# Patient Record
Sex: MALE | Race: WHITE | NOT HISPANIC OR LATINO | Employment: PART TIME | URBAN - METROPOLITAN AREA
[De-identification: names, ages, dates, MRNs, and addresses within clinical notes are randomized per-mention and may not be internally consistent; named-entity substitution may affect disease eponyms.]

---

## 2024-07-15 ENCOUNTER — OFFICE VISIT (OUTPATIENT)
Dept: PRIMARY CARE | Facility: CLINIC | Age: 24
End: 2024-07-15
Payer: COMMERCIAL

## 2024-07-15 ENCOUNTER — TELEPHONE (OUTPATIENT)
Dept: PRIMARY CARE | Facility: CLINIC | Age: 24
End: 2024-07-15

## 2024-07-15 VITALS
HEIGHT: 73 IN | BODY MASS INDEX: 17.1 KG/M2 | DIASTOLIC BLOOD PRESSURE: 70 MMHG | HEART RATE: 61 BPM | SYSTOLIC BLOOD PRESSURE: 120 MMHG | OXYGEN SATURATION: 98 % | WEIGHT: 129 LBS

## 2024-07-15 DIAGNOSIS — R51.9 ACUTE NONINTRACTABLE HEADACHE, UNSPECIFIED HEADACHE TYPE: Primary | ICD-10-CM

## 2024-07-15 PROCEDURE — 1036F TOBACCO NON-USER: CPT | Performed by: FAMILY MEDICINE

## 2024-07-15 PROCEDURE — 99213 OFFICE O/P EST LOW 20 MIN: CPT | Performed by: FAMILY MEDICINE

## 2024-07-15 RX ORDER — HYDROXYZINE HYDROCHLORIDE 25 MG/1
1 TABLET, FILM COATED ORAL 2 TIMES DAILY PRN
COMMUNITY
Start: 2024-05-24

## 2024-07-15 RX ORDER — ESCITALOPRAM OXALATE 10 MG/1
10 TABLET ORAL
COMMUNITY

## 2024-07-15 ASSESSMENT — PATIENT HEALTH QUESTIONNAIRE - PHQ9
SUM OF ALL RESPONSES TO PHQ9 QUESTIONS 1 AND 2: 0
1. LITTLE INTEREST OR PLEASURE IN DOING THINGS: NOT AT ALL
2. FEELING DOWN, DEPRESSED OR HOPELESS: NOT AT ALL

## 2024-07-15 ASSESSMENT — PAIN SCALES - GENERAL: PAINLEVEL: 0-NO PAIN

## 2024-07-15 NOTE — PROGRESS NOTES
"Subjective   Patient ID: Jasvir Galan is a 24 y.o. male who presents for Headache (X 4 days-  intermittently.  He says it has been hard to concentrate, some slight light sensitive.  No nausea or vomiting.  Felt some lightheadedness.   No sinus congestion or post nasal drainage).    HPI here with a 4-day history of headache.  He is mildly sensitive to light when the headache hurts.  Pain is migrating in location.  He did have some dizziness with 1 episode but it has resolved.  Patient currently has no headache at this time.  There is a strong family history of migraine in his family.  Patient stated today that he did get relief by seeking a dark room and using ibuprofen.    Review of Systems  Constitutional: Patient is negative for fever, fatigue, weight change.  HEENT: Patient is positive for pain and pressure above and below the eyes.  He is negative for change in vision, hearing, swallow.  Cardio: Patient is negative for chest pain, lower extremity edema.  Pulmonary: Patient is negative for cough, shortness of breath.  Objective   /70 (BP Location: Left arm, Patient Position: Sitting, BP Cuff Size: Adult)   Pulse 61   Ht 1.854 m (6' 1\")   Wt 58.5 kg (129 lb)   SpO2 98%   BMI 17.02 kg/m²     Physical Exam  General: Awake and alert no apparent distress.  HEENT: No noticeable discomfort to challenge.  TMs with good color and light reflex.  No cervical lymphadenopathy.  No pain to palpation over the maxillary or frontal sinuses.  Cardio: Heart S1-S2 no murmur rub or gallop.  Pulmonary: Lungs clear to auscultation bilaterally.  Assessment/Plan   Problem List Items Addressed This Visit    None  Visit Diagnoses         Codes    Acute nonintractable headache, unspecified headache type    -  Primary   currently resolving.  Etiology is unclear.  This may be sinus pain and pressure or it may be new onset migraine.  Patient to use ibuprofen as needed.  He was advised to try using nasal steroid spray.  If he " sees no relief he is to follow-up here and at that time may consider referral to headache specialist R51.9

## 2024-10-10 ENCOUNTER — TELEPHONE (OUTPATIENT)
Dept: PRIMARY CARE | Facility: CLINIC | Age: 24
End: 2024-10-10
Payer: COMMERCIAL

## 2024-10-30 ENCOUNTER — APPOINTMENT (OUTPATIENT)
Dept: PRIMARY CARE | Facility: CLINIC | Age: 24
End: 2024-10-30
Payer: COMMERCIAL

## 2024-10-30 VITALS
HEART RATE: 67 BPM | OXYGEN SATURATION: 97 % | DIASTOLIC BLOOD PRESSURE: 64 MMHG | BODY MASS INDEX: 17.89 KG/M2 | TEMPERATURE: 97.9 F | SYSTOLIC BLOOD PRESSURE: 106 MMHG | HEIGHT: 73 IN | WEIGHT: 135 LBS

## 2024-10-30 DIAGNOSIS — Z23 ENCOUNTER FOR IMMUNIZATION: ICD-10-CM

## 2024-10-30 DIAGNOSIS — Z00.00 WELL ADULT EXAM: ICD-10-CM

## 2024-10-30 DIAGNOSIS — I83.812 VARICOSE VEINS OF LEFT LOWER EXTREMITY WITH PAIN: ICD-10-CM

## 2024-10-30 DIAGNOSIS — F32.A DEPRESSION, UNSPECIFIED DEPRESSION TYPE: ICD-10-CM

## 2024-10-30 DIAGNOSIS — J45.21 MILD INTERMITTENT ASTHMA WITH EXACERBATION (HHS-HCC): Primary | ICD-10-CM

## 2024-10-30 PROCEDURE — 90656 IIV3 VACC NO PRSV 0.5 ML IM: CPT | Performed by: FAMILY MEDICINE

## 2024-10-30 PROCEDURE — 1036F TOBACCO NON-USER: CPT | Performed by: FAMILY MEDICINE

## 2024-10-30 PROCEDURE — 90471 IMMUNIZATION ADMIN: CPT | Performed by: FAMILY MEDICINE

## 2024-10-30 PROCEDURE — 99395 PREV VISIT EST AGE 18-39: CPT | Performed by: FAMILY MEDICINE

## 2024-10-30 PROCEDURE — 3008F BODY MASS INDEX DOCD: CPT | Performed by: FAMILY MEDICINE

## 2024-10-30 ASSESSMENT — ENCOUNTER SYMPTOMS
DEPRESSION: 0
OCCASIONAL FEELINGS OF UNSTEADINESS: 0
LOSS OF SENSATION IN FEET: 0

## 2024-10-30 ASSESSMENT — PATIENT HEALTH QUESTIONNAIRE - PHQ9
1. LITTLE INTEREST OR PLEASURE IN DOING THINGS: NOT AT ALL
SUM OF ALL RESPONSES TO PHQ9 QUESTIONS 1 AND 2: 0
2. FEELING DOWN, DEPRESSED OR HOPELESS: NOT AT ALL

## 2024-10-30 ASSESSMENT — PAIN SCALES - GENERAL: PAINLEVEL_OUTOF10: 0-NO PAIN

## 2024-11-21 ENCOUNTER — APPOINTMENT (OUTPATIENT)
Dept: VASCULAR SURGERY | Facility: CLINIC | Age: 24
End: 2024-11-21
Payer: COMMERCIAL

## 2025-01-23 ENCOUNTER — OFFICE VISIT (OUTPATIENT)
Dept: VASCULAR SURGERY | Facility: CLINIC | Age: 25
End: 2025-01-23
Payer: MEDICARE

## 2025-01-23 VITALS
OXYGEN SATURATION: 100 % | HEART RATE: 72 BPM | HEIGHT: 72 IN | BODY MASS INDEX: 18.61 KG/M2 | SYSTOLIC BLOOD PRESSURE: 121 MMHG | DIASTOLIC BLOOD PRESSURE: 77 MMHG | WEIGHT: 137.4 LBS

## 2025-01-23 DIAGNOSIS — I83.812 VARICOSE VEINS OF LEG WITH PAIN, LEFT: Primary | ICD-10-CM

## 2025-01-23 PROCEDURE — 99213 OFFICE O/P EST LOW 20 MIN: CPT | Performed by: NURSE PRACTITIONER

## 2025-01-23 PROCEDURE — 99203 OFFICE O/P NEW LOW 30 MIN: CPT | Performed by: NURSE PRACTITIONER

## 2025-01-23 PROCEDURE — 3008F BODY MASS INDEX DOCD: CPT | Performed by: NURSE PRACTITIONER

## 2025-01-23 PROCEDURE — 1036F TOBACCO NON-USER: CPT | Performed by: NURSE PRACTITIONER

## 2025-01-23 ASSESSMENT — COLUMBIA-SUICIDE SEVERITY RATING SCALE - C-SSRS
2. HAVE YOU ACTUALLY HAD ANY THOUGHTS OF KILLING YOURSELF?: NO
1. IN THE PAST MONTH, HAVE YOU WISHED YOU WERE DEAD OR WISHED YOU COULD GO TO SLEEP AND NOT WAKE UP?: NO
6. HAVE YOU EVER DONE ANYTHING, STARTED TO DO ANYTHING, OR PREPARED TO DO ANYTHING TO END YOUR LIFE?: NO

## 2025-01-23 ASSESSMENT — PATIENT HEALTH QUESTIONNAIRE - PHQ9
SUM OF ALL RESPONSES TO PHQ9 QUESTIONS 1 AND 2: 0
2. FEELING DOWN, DEPRESSED OR HOPELESS: NOT AT ALL
1. LITTLE INTEREST OR PLEASURE IN DOING THINGS: NOT AT ALL

## 2025-01-23 NOTE — PROGRESS NOTES
Subjective   Patient ID: Jasvir Galan is a 24 y.o. male who presents for concern of varicose veins    HPI    This is a 24-year-old male born with pectus excavatum and bilateral patellar deformities, required bilateral knee surgeries in middle school, has history of asthma.  He is referred to us from his primary care physician for left leg pain and swelling.  Workup for varicose veins of his left leg.  He was evaluated in the office with his mother present.  States his father suffered from varicose veins which caused pain, swelling and bleeding at times.  Patient states that he noticed the varicose veins 1 to 2 years ago.  They cause mild swelling of the left leg throbbing and heaviness.  He has no issues with his right leg.  He has some ropey varicosities noted in the left medial thigh and calf, tender to palpation.  Denies smoking.      Review of Systems.  As noted in the HPI    Objective   Physical Exam  Constitutional:  Alert and oriented to person, place, date/time in no acute distress.  HEENT:  Atraumatic, normocephalic. PERRL. EOMI.  Nares patent.  Mucous membranes moist.  .   Neck:  Trachea midline.  Respiratory:  Clear to auscultation.  Cardiac:  Regular rate and rhythm.  No murmurs.  Cardiovascular: Please edema of the left leg.  Pulse exam: Radial and femoral pulses palpable bilateral. Pedal pulses: Palpable.  Clustered areas of varicosities in the left medial thigh and calf, tender to palpation.  Abdominal:  Soft, nontender, nondistended, bowel sounds present.  Musculoskeletal:  Moves extremities freely.  Dermatological: Clean and dry   Neurological: Alert and oriented to person, place, date/time  Psych:  Calm, cooperative      Assessment/Plan     Assessment and Plan  Varicose Veins  Venous Insufficiency  Lower extremity edema / pain    Patient advised to undergo VI studies, return to clinic in 1-2 months and have test performed, prior to office visit  20 to 30 mmHg compression stocking  prescription dispensed at this appointment, advised to wear during the day and remove at night  Elevation throughout the day  NSAIDs as needed  Walking 25 minutes daily, 5 times per week    Mother with history of varicose veins which cause bleeding         KAREEM Arzate 01/23/25 9:09 AM

## 2025-02-25 ENCOUNTER — HOSPITAL ENCOUNTER (OUTPATIENT)
Dept: VASCULAR MEDICINE | Facility: CLINIC | Age: 25
Discharge: HOME | End: 2025-02-25
Payer: MEDICARE

## 2025-02-25 DIAGNOSIS — I83.812 VARICOSE VEINS OF LEG WITH PAIN, LEFT: ICD-10-CM

## 2025-02-25 PROCEDURE — 93971 EXTREMITY STUDY: CPT

## 2025-02-25 PROCEDURE — 93971 EXTREMITY STUDY: CPT | Performed by: SURGERY

## 2025-02-27 ENCOUNTER — OFFICE VISIT (OUTPATIENT)
Dept: VASCULAR SURGERY | Facility: CLINIC | Age: 25
End: 2025-02-27
Payer: MEDICARE

## 2025-02-27 VITALS
DIASTOLIC BLOOD PRESSURE: 76 MMHG | HEART RATE: 78 BPM | WEIGHT: 135 LBS | HEIGHT: 72 IN | BODY MASS INDEX: 18.28 KG/M2 | SYSTOLIC BLOOD PRESSURE: 124 MMHG

## 2025-02-27 DIAGNOSIS — I83.812 VARICOSE VEINS OF LEG WITH PAIN, LEFT: Primary | ICD-10-CM

## 2025-02-27 PROCEDURE — 3008F BODY MASS INDEX DOCD: CPT | Performed by: NURSE PRACTITIONER

## 2025-02-27 PROCEDURE — 99213 OFFICE O/P EST LOW 20 MIN: CPT | Performed by: NURSE PRACTITIONER

## 2025-02-27 PROCEDURE — 1036F TOBACCO NON-USER: CPT | Performed by: NURSE PRACTITIONER

## 2025-02-27 ASSESSMENT — ENCOUNTER SYMPTOMS
DEPRESSION: 0
OCCASIONAL FEELINGS OF UNSTEADINESS: 0
LOSS OF SENSATION IN FEET: 0

## 2025-02-27 ASSESSMENT — LIFESTYLE VARIABLES
SKIP TO QUESTIONS 9-10: 1
HOW MANY STANDARD DRINKS CONTAINING ALCOHOL DO YOU HAVE ON A TYPICAL DAY: 1 OR 2
HOW OFTEN DO YOU HAVE SIX OR MORE DRINKS ON ONE OCCASION: NEVER
HOW OFTEN DO YOU HAVE A DRINK CONTAINING ALCOHOL: MONTHLY OR LESS
AUDIT-C TOTAL SCORE: 1

## 2025-02-27 ASSESSMENT — PATIENT HEALTH QUESTIONNAIRE - PHQ9
1. LITTLE INTEREST OR PLEASURE IN DOING THINGS: NOT AT ALL
2. FEELING DOWN, DEPRESSED OR HOPELESS: NOT AT ALL
SUM OF ALL RESPONSES TO PHQ9 QUESTIONS 1 AND 2: 0

## 2025-02-27 ASSESSMENT — PAIN SCALES - GENERAL: PAINLEVEL_OUTOF10: 0-NO PAIN

## 2025-02-27 NOTE — PROGRESS NOTES
Jasvir Adama [birth year]  FUV VI studies    Patient RTC after undergoing VI studies.  Patient in no distress on exam.  Discussed results of venous studies.  He denies leg edema.  Has pain sometimes, he had pain 3 times throughout last year.  He does have a history of knee surgery on the left at 13.  Has been using compression stockings to the calf since previous appointment.    24-year-old male born with pectus excavatum and bilateral patellar deformities, required bilateral knee surgeries in middle school, has history of asthma.  He is referred to us from his primary care physician for left leg pain and swelling.  Workup for varicose veins of his left leg.  He was evaluated in the office with his mother present.  States his father suffered from varicose veins which caused pain, swelling and bleeding at times.  Patient states that he noticed the varicose veins 1 to 2 years ago.  They cause mild swelling of the left leg throbbing and heaviness.  He has no issues with his right leg.  He has some ropey varicosities noted in the left medial thigh and calf, tender to palpation.  Denies smoking.        Review of Systems.  As noted in the HPI        Objective  Physical Exam  Constitutional:  Alert and oriented to person, place, date/time in no acute distress.  HEENT:  Atraumatic, normocephalic. PERRL. EOMI.  Nares patent.  Mucous membranes moist.  .  Neck:  Trachea midline.  Respiratory:  Clear to auscultation.  Cardiac:  Regular rate and rhythm.  No murmurs.  Cardiovascular: Please edema of the left leg.  Pulse exam: Radial and femoral pulses palpable bilateral. Pedal pulses: Palpable.  Clustered areas of varicosities in the left medial thigh and calf, mildly tender to palpation.  No ropey varicosities  Abdominal:  Soft, nontender, nondistended, bowel sounds present.  Musculoskeletal:  Moves extremities freely.  Dermatological: Clean and dry  Neurological: Alert and oriented to person, place, date/time  Psych:  Calm,  cooperative    Studies reviewed, patient had 11.2 seconds of reflux in the SFJ with a diameter of 6.6 but no other enlargement thereafter.  Fibrotic tissue noted mid thigh and knee greater saphenous vein.  Small amount of reflux in the proximal calf greater saphenous vein     Assessment and Plan  Varicose Veins  Venous Insufficiency  Lower extremity edema / pain       Continue wearing 20 to 30 mmHg compression stockings, advised to wear during the day and remove at night  Elevation throughout the day  NSAIDs as needed  Walking 25 minutes daily, 5 times per week  Will discuss in venous clinic with Dr. Lange in April regarding the reflux noted at the SFJ and the fibrotic areas mid thigh and knee GSV     Mother with history of varicose veins which cause bleeding

## 2025-05-16 ENCOUNTER — TELEPHONE (OUTPATIENT)
Dept: VASCULAR SURGERY | Facility: HOSPITAL | Age: 25
End: 2025-05-16
Payer: MEDICARE

## 2025-05-16 NOTE — TELEPHONE ENCOUNTER
I discussed the patient with Stephie Madison CNP.  Also called the patient on 5/16/2025, he has improvement in his leg wearing compression stockings.  If his leg begins hurting again and has worsening swelling advise he follow-up with Stephie Madison, phone number dispensed to patient.  He is amenable to plan.

## 2025-09-23 ENCOUNTER — APPOINTMENT (OUTPATIENT)
Dept: PRIMARY CARE | Facility: CLINIC | Age: 25
End: 2025-09-23
Payer: MEDICARE